# Patient Record
Sex: MALE | Race: WHITE
[De-identification: names, ages, dates, MRNs, and addresses within clinical notes are randomized per-mention and may not be internally consistent; named-entity substitution may affect disease eponyms.]

---

## 2021-06-16 ENCOUNTER — HOSPITAL ENCOUNTER (EMERGENCY)
Dept: HOSPITAL 35 - ER | Age: 61
LOS: 1 days | Discharge: HOME | End: 2021-06-17
Payer: COMMERCIAL

## 2021-06-16 VITALS — HEIGHT: 72 IN | WEIGHT: 297.01 LBS | BODY MASS INDEX: 40.23 KG/M2

## 2021-06-16 DIAGNOSIS — G62.9: ICD-10-CM

## 2021-06-16 DIAGNOSIS — Z79.899: ICD-10-CM

## 2021-06-16 DIAGNOSIS — Z88.5: ICD-10-CM

## 2021-06-16 DIAGNOSIS — M19.90: ICD-10-CM

## 2021-06-16 DIAGNOSIS — I10: ICD-10-CM

## 2021-06-16 DIAGNOSIS — R42: Primary | ICD-10-CM

## 2021-06-16 DIAGNOSIS — G43.909: ICD-10-CM

## 2021-06-16 DIAGNOSIS — R73.9: ICD-10-CM
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
## 2021-06-17 VITALS — SYSTOLIC BLOOD PRESSURE: 150 MMHG | DIASTOLIC BLOOD PRESSURE: 92 MMHG

## 2021-06-17 LAB
ALBUMIN SERPL-MCNC: 3.5 G/DL (ref 3.4–5)
ALT SERPL-CCNC: 28 U/L (ref 30–65)
AMYLASE SERPL-CCNC: 25 U/L (ref 25–115)
ANION GAP SERPL CALC-SCNC: 10 MMOL/L (ref 7–16)
AST SERPL-CCNC: 20 U/L (ref 15–37)
BASOPHILS NFR BLD AUTO: 0.1 % (ref 0–2)
BILIRUB DIRECT SERPL-MCNC: 0.2 MG/DL
BILIRUB SERPL-MCNC: 0.9 MG/DL (ref 0.2–1)
BUN SERPL-MCNC: 16 MG/DL (ref 7–18)
CALCIUM SERPL-MCNC: 8.3 MG/DL (ref 8.5–10.1)
CHLORIDE SERPL-SCNC: 104 MMOL/L (ref 98–107)
CO2 SERPL-SCNC: 26 MMOL/L (ref 21–32)
CREAT SERPL-MCNC: 1.3 MG/DL (ref 0.7–1.3)
EOSINOPHIL NFR BLD: 1.4 % (ref 0–3)
ERYTHROCYTE [DISTWIDTH] IN BLOOD BY AUTOMATED COUNT: 15.7 % (ref 10.5–14.5)
GLUCOSE SERPL-MCNC: 229 MG/DL (ref 74–106)
GRANULOCYTES NFR BLD MANUAL: 75.8 % (ref 36–66)
HCT VFR BLD CALC: 42.2 % (ref 42–52)
HGB BLD-MCNC: 13.8 GM/DL (ref 14–18)
LIPASE: 89 U/L (ref 73–393)
LYMPHOCYTES NFR BLD AUTO: 14.5 % (ref 24–44)
MAGNESIUM SERPL-MCNC: 2.1 MG/DL (ref 1.8–2.4)
MCH RBC QN AUTO: 29 PG (ref 26–34)
MCHC RBC AUTO-ENTMCNC: 32.8 G/DL (ref 28–37)
MCV RBC: 88.4 FL (ref 80–100)
MONOCYTES NFR BLD: 8.2 % (ref 1–8)
NEUTROPHILS # BLD: 3.9 THOU/UL (ref 1.4–8.2)
PHOSPHATE SERPL-MCNC: 2.9 MG/DL (ref 2.5–4.9)
PLATELET # BLD: 178 THOU/UL (ref 150–400)
POTASSIUM SERPL-SCNC: 4.1 MMOL/L (ref 3.5–5.1)
PROT SERPL-MCNC: 7.4 G/DL (ref 6.4–8.2)
RBC # BLD AUTO: 4.78 MIL/UL (ref 4.5–6)
SODIUM SERPL-SCNC: 140 MMOL/L (ref 136–145)
TROPONIN I SERPL-MCNC: <0.06 NG/ML (ref ?–0.06)
WBC # BLD AUTO: 5.1 THOU/UL (ref 4–11)

## 2021-06-17 NOTE — EKG
[place] [address] [facility]
[place]  [address]
Phone:  [phone]                    ELECTROCARDIOGRAM REPORT      
_______________________________________________________________________________
 
Name:       ARMAND ORTIZ               Room #:                     DEP SOLANGE LOCKHART#:      [id number]     Account #:      [id number]  
Admission:  21    Attend Phys:                          
Discharge:  21    Date of Birth:  [birth year]  
                                                          Report #: [id number]
   [id number]
_______________________________________________________________________________
                         [facility] ED
                                       
Test Date:    2021               Test Time:    00:04:36
Pat Name:     ARMAND ORTIZ          Department:   
Patient ID:   [id number]            Room:          
Gender:       M                        Technician:   MARY KAY
:          1960               Requested By: Benjy Garcia
Order Number: [id number] MD:   Faisal Baldwin
                                 Measurements
Intervals                              Axis          
Rate:         74                       P:            17
MN:           222                      QRS:          -13
QRSD:         125                      T:            19
QT:           401                                    
QTc:          445                                    
                           Interpretive Statements
Sinus rhythm
Prolonged MN interval
Left ventricular hypertrophy
Baseline wander in lead(s) V3
No previous ECG available for comparison
Electronically Signed On 2021 7:14:50 CDT by Faisal Baldwin
[email or web address]
 
 
 
 
 
 
 
 
 
 
 
 
 
 
 
 
 
 
 
 
  <ELECTRONICALLY SIGNED>
   By: Faisal Baldwin MD, [facility]    
  21     0714
D: 21 0004                           _____________________________________
T: 21 0004                           Faisal Baldwin MD, FACDAVIDE      /EPI